# Patient Record
Sex: MALE | Race: WHITE | NOT HISPANIC OR LATINO | URBAN - METROPOLITAN AREA
[De-identification: names, ages, dates, MRNs, and addresses within clinical notes are randomized per-mention and may not be internally consistent; named-entity substitution may affect disease eponyms.]

---

## 2025-02-12 ENCOUNTER — APPOINTMENT (OUTPATIENT)
Dept: PRIMARY CARE | Facility: CLINIC | Age: 70
End: 2025-02-12
Payer: COMMERCIAL

## 2025-02-12 VITALS
SYSTOLIC BLOOD PRESSURE: 120 MMHG | WEIGHT: 164.6 LBS | BODY MASS INDEX: 24.95 KG/M2 | HEART RATE: 66 BPM | HEIGHT: 68 IN | DIASTOLIC BLOOD PRESSURE: 90 MMHG | OXYGEN SATURATION: 95 %

## 2025-02-12 DIAGNOSIS — Z13.220 LIPID SCREENING: ICD-10-CM

## 2025-02-12 DIAGNOSIS — R53.83 OTHER FATIGUE: ICD-10-CM

## 2025-02-12 DIAGNOSIS — Z51.81 ENCOUNTER FOR THERAPEUTIC DRUG LEVEL MONITORING: ICD-10-CM

## 2025-02-12 DIAGNOSIS — Z12.11 COLON CANCER SCREENING: ICD-10-CM

## 2025-02-12 DIAGNOSIS — R79.89 ELEVATED TSH: ICD-10-CM

## 2025-02-12 DIAGNOSIS — E78.00 ELEVATED LDL CHOLESTEROL LEVEL: ICD-10-CM

## 2025-02-12 DIAGNOSIS — Z12.5 PROSTATE CANCER SCREENING: ICD-10-CM

## 2025-02-12 DIAGNOSIS — Z00.00 ENCOUNTER FOR MEDICARE ANNUAL WELLNESS EXAM: Primary | ICD-10-CM

## 2025-02-12 DIAGNOSIS — E34.9 TESTOSTERONE DEFICIENCY: ICD-10-CM

## 2025-02-12 DIAGNOSIS — E55.9 VITAMIN D DEFICIENCY: ICD-10-CM

## 2025-02-12 DIAGNOSIS — E78.01 FAMILIAL HYPERCHOLESTEROLEMIA: ICD-10-CM

## 2025-02-12 PROBLEM — N52.8 OTHER MALE ERECTILE DYSFUNCTION: Status: ACTIVE | Noted: 2018-01-22

## 2025-02-12 PROCEDURE — G0439 PPPS, SUBSEQ VISIT: HCPCS | Performed by: STUDENT IN AN ORGANIZED HEALTH CARE EDUCATION/TRAINING PROGRAM

## 2025-02-12 PROCEDURE — 1159F MED LIST DOCD IN RCRD: CPT | Performed by: STUDENT IN AN ORGANIZED HEALTH CARE EDUCATION/TRAINING PROGRAM

## 2025-02-12 PROCEDURE — 3008F BODY MASS INDEX DOCD: CPT | Performed by: STUDENT IN AN ORGANIZED HEALTH CARE EDUCATION/TRAINING PROGRAM

## 2025-02-12 PROCEDURE — 1170F FXNL STATUS ASSESSED: CPT | Performed by: STUDENT IN AN ORGANIZED HEALTH CARE EDUCATION/TRAINING PROGRAM

## 2025-02-12 PROCEDURE — 1124F ACP DISCUSS-NO DSCNMKR DOCD: CPT | Performed by: STUDENT IN AN ORGANIZED HEALTH CARE EDUCATION/TRAINING PROGRAM

## 2025-02-12 PROCEDURE — 99204 OFFICE O/P NEW MOD 45 MIN: CPT | Performed by: STUDENT IN AN ORGANIZED HEALTH CARE EDUCATION/TRAINING PROGRAM

## 2025-02-12 PROCEDURE — 1036F TOBACCO NON-USER: CPT | Performed by: STUDENT IN AN ORGANIZED HEALTH CARE EDUCATION/TRAINING PROGRAM

## 2025-02-12 RX ORDER — TESTOSTERONE 50 MG/5G
50 GEL TRANSDERMAL DAILY
COMMUNITY
Start: 2015-01-01

## 2025-02-12 ASSESSMENT — PATIENT HEALTH QUESTIONNAIRE - PHQ9
1. LITTLE INTEREST OR PLEASURE IN DOING THINGS: NOT AT ALL
SUM OF ALL RESPONSES TO PHQ9 QUESTIONS 1 AND 2: 0
2. FEELING DOWN, DEPRESSED OR HOPELESS: NOT AT ALL

## 2025-02-12 ASSESSMENT — ACTIVITIES OF DAILY LIVING (ADL)
GROCERY_SHOPPING: INDEPENDENT
DOING_HOUSEWORK: INDEPENDENT
MANAGING_FINANCES: INDEPENDENT
TAKING_MEDICATION: INDEPENDENT
BATHING: INDEPENDENT
DRESSING: INDEPENDENT

## 2025-02-12 ASSESSMENT — ENCOUNTER SYMPTOMS
LOSS OF SENSATION IN FEET: 0
OCCASIONAL FEELINGS OF UNSTEADINESS: 0
DEPRESSION: 0

## 2025-02-12 NOTE — PROGRESS NOTES
Subjective   Reason for Visit: Phoenix Casanova is an 69 y.o. male here for a Medicare Wellness visit.     Past Medical, Surgical, and Family History reviewed and updated in chart.    Reviewed all medications by prescribing practitioner or clinical pharmacist (such as prescriptions, OTCs, herbal therapies and supplements) and documented in the medical record.    HPI  Medicare wellness visit/new patient visit  Patient is here today as a new patient, requesting to establish care.  Previous primary care provider was Dr. Carolyn Menchaca, who has a private practice and most of his records are not on the chart  Overall patient is doing well.   Denies any chest pain, shortness of breath or wheezing upon exertion.  Denies any fever chills or constitutional symptoms, denies any unintentional weight loss.  Denies any nausea/vomiting or constipation/diarrhea.  Denies any urinary symptoms.  Denies any recent change in hearing or vision.  Denies any lightheadedness, dizziness or balance problem  Immunization: Tdap declined influenza vaccine declined   Shingrix declined PCV20 declined  COVID-19 vaccine (Pfizer Moderna) declined:  Colon Cancer Screening: No family history, Cologuard 2020, needs new referral  Diet: Regular well-balanced diet  Exercise: Exercise regularly, plays [employer] and stretching exercise  Tobacco: Denies use  EtOH: Rarely Socially   Does periodically skin checks with dermatology    Other problems/diagnoses addressed and reviewed during this encounter    Testosterone deficiency/fatigue  Patient has a history of testosterone deficiency treated by her previous PCP with testosterone gel 50/5 mg daily  Says that he has been doing well on this medication and his testosterone level has always been on the lower side of the normal.  Denies any medication side effects.  Overall feels well on this regimen and asking for refills    3.  History of elevated TSH  Patient has a history of elevated TSH of 5.31.  He is asymptomatic,  denying any palpitation, heat/cold intolerance, diarrhea/constipation.  He is willing to recheck his TSH level    4.  Vitamin D deficiency  He has a history of vitamin D deficiency, and currently takes over-the-counter vitamin D supplements and fish oil  Willing to recheck his vitamin D level    5.  History of elevated LDL/family history of hypercholesterolemia  Has a history of elevated LDL and family history of dyslipidemia, obesity, metabolic syndrome.  Willing to check his lipid panel for monitoring  Currently trying to manage with lifestyle modification    Surgical history: History of adenoid and tonsillectomy, left shoulder surgery and bilateral cataract surgery    Family history: Admits to having a family history of obesity, diabetes mellitus, hypertension, MI, stroke, and COPD in both sides of his family    Social history: Patient has never smoked and drinks alcohol very rarely.  Used to work as , retired since 2019    Allergies   Allergen Reactions    Bee Venom Protein (Honey Bee) Hives and Swelling    Venom-Wasp Hives and Swelling    Penicillins Other     Pt states this is ineffective for him from having to take it too much as a child    Poison Sumac Extract Itching and Dermatitis     Current Outpatient Medications   Medication Instructions    testosterone (ANDROGEL) 50 mg, Daily       There is no immunization history on file for this patient.    Patient Self Assessment of Health Status  Patient Self Assessment: Good    Nutrition and Exercise  Current Diet: Heart Healthy Diet  Adequate Fluid Intake: Yes  Caffeine: No  Exercise Frequency: Regularly    Functional Ability/Level of Safety  Cognitive Impairment Observed: No cognitive impairment observed    Home Safety Risk Factors: None    Patient Care Team:  Deana Garrett DO as PCP - General (Family Medicine)     Review of Systems  All pertinent positive symptoms are included in the history of present illness.    All other systems have been  "reviewed and are negative and noncontributory to this patient's current ailments.    Objective   Vitals:  /90 (BP Location: Left arm, Patient Position: Sitting, BP Cuff Size: Adult)   Pulse 66   Ht 1.715 m (5' 7.5\")   Wt 74.7 kg (164 lb 9.6 oz)   SpO2 95%   BMI 25.40 kg/m²     No visits with results within 6 Month(s) from this visit.   Latest known visit with results is:   No results found for any previous visit.       Physical Exam  Constitutional:       General: He is not in acute distress.     Appearance: Normal appearance. He is normal weight. He is not ill-appearing.   HENT:      Head: Normocephalic and atraumatic.      Right Ear: Ear canal and external ear normal. There is no impacted cerumen.      Left Ear: Ear canal normal. There is no impacted cerumen.      Nose: No congestion or rhinorrhea.      Mouth/Throat:      Mouth: Mucous membranes are moist.      Pharynx: Oropharynx is clear. No oropharyngeal exudate or posterior oropharyngeal erythema.   Eyes:      General: No scleral icterus.     Extraocular Movements: Extraocular movements intact.      Conjunctiva/sclera: Conjunctivae normal.      Pupils: Pupils are equal, round, and reactive to light.   Cardiovascular:      Rate and Rhythm: Normal rate and regular rhythm.      Pulses: Normal pulses.      Heart sounds: Normal heart sounds. No murmur heard.  Pulmonary:      Effort: Pulmonary effort is normal. No respiratory distress.      Breath sounds: Normal breath sounds. No wheezing, rhonchi or rales.   Abdominal:      General: Abdomen is flat. Bowel sounds are normal.      Palpations: Abdomen is soft.      Tenderness: There is no abdominal tenderness. There is no guarding or rebound.      Hernia: No hernia is present.   Musculoskeletal:         General: Normal range of motion.      Right lower leg: No edema.      Left lower leg: No edema.   Skin:     General: Skin is warm.      Capillary Refill: Capillary refill takes less than 2 seconds.      " Findings: No lesion or rash.   Neurological:      General: No focal deficit present.      Mental Status: He is alert and oriented to person, place, and time.   Psychiatric:         Mood and Affect: Mood normal.         Behavior: Behavior normal.             Assessment/Plan   Diagnoses and all orders for this visit:  Encounter for Medicare annual wellness exam  -     Lipid Panel; Future  -     Comprehensive Metabolic Panel; Future  -     Prostate Specific Antigen, Screen; Future  -     Testosterone, total and free; Future  -     Drug Screen, Urine With Reflex to Confirmation  -     CBC and Auto Differential; Future  Complete history and physical examination was performed  Routine lab work was ordered today  Requisition for Cologuard was also ordered today  We will notify of test results once available and make treatment recommendations accordingly  Educated about the importance of conducting a healthy lifestyle, following well-balanced diet and exercising regularly  Patient is declining all recommended immunizations at this moment  11/2019 CT cardiac calcium score had a total value of 0    Colon cancer screening  -     Cologuard® colon cancer screening; Future    Testosterone deficiency  -     Lipid Panel; Future  -     Prostate Specific Antigen, Screen; Future  -     Testosterone, total and free; Future  -     Drug Screen, Urine With Reflex to Confirmation  -     CBC and Auto Differential; Future  Stable,   Discussed in length about side effects of testosterone supplements  Agreed to continue same regimen  Refills were sent to local pharmacy  UDS/CSA were obtained today  Encounter for therapeutic drug level monitoring  -     Drug Screen, Urine With Reflex to Confirmation     Prostate cancer screening  -     Prostate Specific Antigen, Screen; Future    Elevated LDL/Familial hypercholesterolemia  -     Lipid Panel; Future  Patient has a family history of elevated cholesterol, metabolic syndrome, MI and  stroke.  Ordered lipid panel to monitor his cholesterol  We will review results and make treatment accommodation accordingly    History of elevated TSH  Asymptomatic  TSH with reflex T4 was ordered today.  We will review results and make treatment combination accordingly    Vitamin D deficiency  Vitamin D level was ordered today  Recommend continuing OTC vitamin D supplements        Thank you for letting us be a part of your care team.  Please call the office if you have further questions or concerns regarding your care    Otherwise, please follow-up in 12 months for continued care and refills as well as your yearly physical examination    Blanche Andres MD  PGY2, FM Resident

## 2025-02-13 LAB
25(OH)D3+25(OH)D2 SERPL-MCNC: 59 NG/ML (ref 30–100)
T4 FREE SERPL-MCNC: 1.2 NG/DL (ref 0.8–1.8)
TSH SERPL-ACNC: 4.93 MIU/L (ref 0.4–4.5)

## 2025-02-13 NOTE — RESULT ENCOUNTER NOTE
Cholesterol is very elevated at 265, HDL 55, triglycerides 82,   I would truly recommend continuing diet and exercise but this LDL is very high above 190    An over-the-counter supplement that could be taken would be red yeast rice that he can look into because I know he does not wish to fully go onto cholesterol-lowering medication    Thyroid-stimulating hormone slightly elevated at 4.93 but the free T4 within normal limits so nothing needs to be done    Sugar, kidneys, liver, electrolytes are all within normal limits    Prostate within normal limits    Complete blood cell count shows no anemia    Vitamin D within normal limits

## 2025-02-14 ENCOUNTER — PATIENT MESSAGE (OUTPATIENT)
Dept: PRIMARY CARE | Facility: CLINIC | Age: 70
End: 2025-02-14
Payer: MEDICARE

## 2025-02-16 LAB
ALBUMIN SERPL-MCNC: 4.6 G/DL (ref 3.6–5.1)
ALP SERPL-CCNC: 67 U/L (ref 35–144)
ALT SERPL-CCNC: 16 U/L (ref 9–46)
ANION GAP SERPL CALCULATED.4IONS-SCNC: 9 MMOL/L (CALC) (ref 7–17)
AST SERPL-CCNC: 19 U/L (ref 10–35)
BASOPHILS # BLD AUTO: 30 CELLS/UL (ref 0–200)
BASOPHILS NFR BLD AUTO: 0.5 %
BILIRUB SERPL-MCNC: 1.1 MG/DL (ref 0.2–1.2)
BUN SERPL-MCNC: 16 MG/DL (ref 7–25)
CALCIUM SERPL-MCNC: 9.4 MG/DL (ref 8.6–10.3)
CHLORIDE SERPL-SCNC: 104 MMOL/L (ref 98–110)
CHOLEST SERPL-MCNC: 265 MG/DL
CHOLEST/HDLC SERPL: 4.8 (CALC)
CO2 SERPL-SCNC: 27 MMOL/L (ref 20–32)
CREAT SERPL-MCNC: 0.96 MG/DL (ref 0.7–1.35)
EGFRCR SERPLBLD CKD-EPI 2021: 87 ML/MIN/1.73M2
EOSINOPHIL # BLD AUTO: 282 CELLS/UL (ref 15–500)
EOSINOPHIL NFR BLD AUTO: 4.7 %
ERYTHROCYTE [DISTWIDTH] IN BLOOD BY AUTOMATED COUNT: 12.9 % (ref 11–15)
GLUCOSE SERPL-MCNC: 95 MG/DL (ref 65–99)
HCT VFR BLD AUTO: 46.8 % (ref 38.5–50)
HDLC SERPL-MCNC: 55 MG/DL
HGB BLD-MCNC: 15.7 G/DL (ref 13.2–17.1)
LDLC SERPL CALC-MCNC: 191 MG/DL (CALC)
LYMPHOCYTES # BLD AUTO: 1872 CELLS/UL (ref 850–3900)
LYMPHOCYTES NFR BLD AUTO: 31.2 %
MCH RBC QN AUTO: 30.2 PG (ref 27–33)
MCHC RBC AUTO-ENTMCNC: 33.5 G/DL (ref 32–36)
MCV RBC AUTO: 90 FL (ref 80–100)
MONOCYTES # BLD AUTO: 474 CELLS/UL (ref 200–950)
MONOCYTES NFR BLD AUTO: 7.9 %
NEUTROPHILS # BLD AUTO: 3342 CELLS/UL (ref 1500–7800)
NEUTROPHILS NFR BLD AUTO: 55.7 %
NONHDLC SERPL-MCNC: 210 MG/DL (CALC)
PLATELET # BLD AUTO: 223 THOUSAND/UL (ref 140–400)
PMV BLD REES-ECKER: 11 FL (ref 7.5–12.5)
POTASSIUM SERPL-SCNC: 4.6 MMOL/L (ref 3.5–5.3)
PROT SERPL-MCNC: 7.1 G/DL (ref 6.1–8.1)
PSA SERPL-MCNC: 1.56 NG/ML
RBC # BLD AUTO: 5.2 MILLION/UL (ref 4.2–5.8)
SODIUM SERPL-SCNC: 140 MMOL/L (ref 135–146)
TESTOST FREE SERPL-MCNC: 54.6 PG/ML (ref 35–155)
TESTOST SERPL-MCNC: 387 NG/DL (ref 250–1100)
TRIGL SERPL-MCNC: 82 MG/DL
WBC # BLD AUTO: 6 THOUSAND/UL (ref 3.8–10.8)

## 2025-02-24 LAB — NONINV COLON CA DNA+OCC BLD SCRN STL QL: NEGATIVE

## 2025-03-19 ENCOUNTER — HOSPITAL ENCOUNTER (OUTPATIENT)
Dept: RADIOLOGY | Facility: CLINIC | Age: 70
Discharge: HOME | End: 2025-03-19
Payer: MEDICARE

## 2025-03-19 DIAGNOSIS — E78.01 FAMILIAL HYPERCHOLESTEROLEMIA: ICD-10-CM

## 2025-03-19 PROCEDURE — 75571 CT HRT W/O DYE W/CA TEST: CPT
